# Patient Record
Sex: FEMALE | Race: WHITE | NOT HISPANIC OR LATINO | Employment: OTHER | URBAN - METROPOLITAN AREA
[De-identification: names, ages, dates, MRNs, and addresses within clinical notes are randomized per-mention and may not be internally consistent; named-entity substitution may affect disease eponyms.]

---

## 2023-04-06 DIAGNOSIS — R92.8 ABNORMAL MAMMOGRAM: Primary | ICD-10-CM

## 2023-04-17 PROBLEM — N63.13 MASS OF LOWER OUTER QUADRANT OF RIGHT BREAST: Status: ACTIVE | Noted: 2023-04-17

## 2023-04-17 NOTE — PROGRESS NOTES
[facility]  Edmundo Arrington MD, FACS  Jean Marie Cisneros NP-C      Chief Complaint:   Faina Villa is a 57 y.o. female presenting today after her imaging workup found a suspicious mass in the left  breast for which biopsy has been recommended.   for She reports no interval changes on her self-breast examination.     History of Present Illness:   Faina Villa is a 57 y.o. female who presents on with a left  breast mass for which biopsy has been recommended.  MD:::Patti Harmon NP    Past Medical History:   Diagnosis Date    Mass of lower outer quadrant of right breast 4/17/2023       History reviewed. No pertinent surgical history.     Current Outpatient Medications:     hydrOXYchloroQUINE (PLAQUENIL) 200 mg tablet, Take 200 mg by mouth 2 (two) times daily., Disp: , Rfl:     pantoprazole (PROTONIX) 20 MG tablet, Take 1 tablet by mouth 2 (two) times daily., Disp: , Rfl:     amLODIPine (NORVASC) 10 MG tablet, Take 10 mg by mouth., Disp: , Rfl:     biotin 10 mg Tab, Take by mouth., Disp: , Rfl:     cholecalciferol, vitamin D3, 325 mcg (13,000 unit) Cap capsule, Take by mouth., Disp: , Rfl:     co-enzyme Q-10 30 mg capsule, Take 100 mg by mouth., Disp: , Rfl:     estradioL (ESTRACE) 1 MG tablet, Take 1 mg by mouth., Disp: , Rfl:     ezetimibe (ZETIA) 10 mg tablet, Take 10 mg by mouth., Disp: , Rfl:     hydroCHLOROthiazide (HYDRODIURIL) 25 MG tablet, Take 25 mg by mouth., Disp: , Rfl:     losartan (COZAAR) 100 MG tablet, Take 100 mg by mouth., Disp: , Rfl:     montelukast (SINGULAIR) 10 mg tablet, Take 10 mg by mouth., Disp: , Rfl:     SYMBICORT 160-4.5 mcg/actuation HFAA, Inhale 2 puffs into the lungs 2 (two) times daily., Disp: , Rfl:    Review of patient's allergies indicates:   Allergen Reactions    Codeine Itching    Egg Other (See Comments)     Coughing    Lactase Other (See Comments)     Coughing     Oxycodone-acetaminophen Itching      Social History      Tobacco Use    Smoking status: Not on file    Smokeless tobacco: Not on file   Substance Use Topics    Alcohol use: Not on file      History reviewed. No pertinent family history.     Review of Systems   Integumentary:  Negative for color change, rash, mole/lesion, breast mass, breast discharge and breast tenderness.   Breast: Negative for mass and tenderness     Physical Exam   Constitutional: She appears well-developed. She is cooperative.   HENT:   Head: Normocephalic.   Cardiovascular:  Normal rate and regular rhythm.            Pulmonary/Chest: She exhibits no tenderness and no bony tenderness. Right breast exhibits no mass, no nipple discharge, no skin change and no tenderness. Left breast exhibits no mass, no nipple discharge, no skin change and no tenderness.   Abdominal: Soft. Normal appearance.   Musculoskeletal: Lymphadenopathy:      Upper Body:      Right upper body: No supraclavicular or axillary adenopathy.      Left upper body: No supraclavicular or axillary adenopathy.     Neurological: She is alert.   Skin: No rash noted.        MAMMOGRAM REPORT: The left breast is heterogeneously dense, which may obscure small masses. In the left breast at 4-5 o'clock, posterior depth, is a 10 x 12 mm mass, stable in size. However several small, round calcifications   have developed in the mass since prior studies. Immediately posterior to the mass is a biopsy marker.    ULTRASOUND REPORT: at 4:00 o'clock an 8-10 mm mass most likely corresponding to the mammographic finding.     NOTE:::We viewed her films together at today's visit.  We discussed the multiple views obtained and the important findings.  Even benign changes were mentioned and her questions were answered.        ASSESSMENT and PLAN of CARE    1. Mass of lower outer quadrant of right breast  Assessment & Plan:  Patient has been given the options of sonocore and excisional biopsy and all indications for each have been discussed. We reviewed her films  and reports. She understands the reasons behind obtaining tissue in order to determine a diagnosis.  We discussed the possible outcomes to include: benign, atypical, cancerous and non-correlative.  The last three would require an additional procedure.  She knows that further recommendations will be made after receiving the pathology report and that additional surgery/interventions may be needed.      PLAN:::: LEFT SONOCORE BREAST BIOPSY.    I will call her as soon as her pathology report is received and make additional recommendations.          2. Abnormal mammogram  -     Ambulatory referral/consult to Breast Surgery    Medical Decision Making:  It is my impression that this patient suffers all conditions contained in this medical document.  Each of these conditions did affect our plan of care and my medical decision making today.  It is my opinion that the medical decision making concerning this patient was of moderate difficulty based on the aforementioned conditions.  Any further recommendations will be communicated to the patient by me.  I have reviewed and verified her allergies, list of medications, medical and surgical histories, social history, and a pertinent review of symptoms.    Follow up:  I will phone her with her pathology report and make additional recommendations at that time.        Addendum: Her left sonocore core breast biopsy showed benign changes, and nothing atypical or cancerous. We will repeat her mammogram in 6 months.  She is aware.           details…

## 2023-04-18 NOTE — ASSESSMENT & PLAN NOTE
Patient has been given the options of sonocore and excisional biopsy and all indications for each have been discussed. We reviewed her films and reports. She understands the reasons behind obtaining tissue in order to determine a diagnosis.  We discussed the possible outcomes to include: benign, atypical, cancerous and non-correlative.  The last three would require an additional procedure.  She knows that further recommendations will be made after receiving the pathology report and that additional surgery/interventions may be needed.      PLAN:::: LEFT SONOCORE BREAST BIOPSY.    I will call her as soon as her pathology report is received and make additional recommendations.

## 2023-04-24 ENCOUNTER — OFFICE VISIT (OUTPATIENT)
Dept: SURGERY | Facility: CLINIC | Age: 58
End: 2023-04-24
Payer: COMMERCIAL

## 2023-04-24 VITALS — HEIGHT: 63 IN | BODY MASS INDEX: 29.23 KG/M2 | WEIGHT: 165 LBS

## 2023-04-24 DIAGNOSIS — R92.8 ABNORMAL MAMMOGRAM: ICD-10-CM

## 2023-04-24 DIAGNOSIS — N63.13 MASS OF LOWER OUTER QUADRANT OF RIGHT BREAST: Primary | ICD-10-CM

## 2023-04-24 PROCEDURE — 1160F PR REVIEW ALL MEDS BY PRESCRIBER/CLIN PHARMACIST DOCUMENTED: ICD-10-PCS | Mod: CPTII,S$GLB,, | Performed by: SPECIALIST

## 2023-04-24 PROCEDURE — 99204 PR OFFICE/OUTPT VISIT, NEW, LEVL IV, 45-59 MIN: ICD-10-PCS | Mod: S$GLB,,, | Performed by: SPECIALIST

## 2023-04-24 PROCEDURE — 1160F RVW MEDS BY RX/DR IN RCRD: CPT | Mod: CPTII,S$GLB,, | Performed by: SPECIALIST

## 2023-04-24 PROCEDURE — 1159F MED LIST DOCD IN RCRD: CPT | Mod: CPTII,S$GLB,, | Performed by: SPECIALIST

## 2023-04-24 PROCEDURE — 4010F PR ACE/ARB THEARPY RXD/TAKEN: ICD-10-PCS | Mod: CPTII,S$GLB,, | Performed by: SPECIALIST

## 2023-04-24 PROCEDURE — 1159F PR MEDICATION LIST DOCUMENTED IN MEDICAL RECORD: ICD-10-PCS | Mod: CPTII,S$GLB,, | Performed by: SPECIALIST

## 2023-04-24 PROCEDURE — 4010F ACE/ARB THERAPY RXD/TAKEN: CPT | Mod: CPTII,S$GLB,, | Performed by: SPECIALIST

## 2023-04-24 PROCEDURE — 99204 OFFICE O/P NEW MOD 45 MIN: CPT | Mod: S$GLB,,, | Performed by: SPECIALIST

## 2023-04-24 PROCEDURE — 3008F BODY MASS INDEX DOCD: CPT | Mod: CPTII,S$GLB,, | Performed by: SPECIALIST

## 2023-04-24 PROCEDURE — 3008F PR BODY MASS INDEX (BMI) DOCUMENTED: ICD-10-PCS | Mod: CPTII,S$GLB,, | Performed by: SPECIALIST

## 2023-04-24 RX ORDER — MONTELUKAST SODIUM 10 MG/1
10 TABLET ORAL
COMMUNITY
Start: 2023-03-28

## 2023-04-24 RX ORDER — BUDESONIDE AND FORMOTEROL FUMARATE DIHYDRATE 160; 4.5 UG/1; UG/1
2 AEROSOL RESPIRATORY (INHALATION) 2 TIMES DAILY
COMMUNITY
Start: 2023-03-28

## 2023-04-24 RX ORDER — ESTRADIOL 1 MG/1
1 TABLET ORAL
COMMUNITY
Start: 2023-03-28

## 2023-04-24 RX ORDER — BIOTIN 10 MG
TABLET ORAL
COMMUNITY

## 2023-04-24 RX ORDER — EZETIMIBE 10 MG/1
10 TABLET ORAL
COMMUNITY
Start: 2023-03-28

## 2023-04-24 RX ORDER — AMLODIPINE BESYLATE 10 MG/1
10 TABLET ORAL
COMMUNITY
Start: 2023-03-28

## 2023-04-24 RX ORDER — PANTOPRAZOLE SODIUM 20 MG/1
1 TABLET, DELAYED RELEASE ORAL 2 TIMES DAILY
COMMUNITY
Start: 2022-10-24

## 2023-04-24 RX ORDER — HYDROXYCHLOROQUINE SULFATE 200 MG/1
200 TABLET, FILM COATED ORAL 2 TIMES DAILY
COMMUNITY
Start: 2023-02-22

## 2023-04-24 RX ORDER — LOSARTAN POTASSIUM 100 MG/1
100 TABLET ORAL
COMMUNITY
Start: 2023-03-28

## 2023-04-24 RX ORDER — UBIDECARENONE 30 MG
100 CAPSULE ORAL
COMMUNITY

## 2023-04-24 RX ORDER — HYDROCHLOROTHIAZIDE 25 MG/1
25 TABLET ORAL
COMMUNITY
Start: 2023-03-28

## 2023-04-28 PROBLEM — N63.23 MASS OF LOWER OUTER QUADRANT OF LEFT BREAST: Status: ACTIVE | Noted: 2023-04-17

## 2023-04-28 NOTE — PROGRESS NOTES
[facility]  Edmundo Arrington MD, FACS  Jean Marie Cisneros NP-C      Chief Complaint:   Faina Villa is a 57 y.o. female presenting today after her imaging workup found a suspicious mass in the left  breast.  Sonocore biopsy was benign but this did not correlate and excision has been recommended.    History of Present Illness:   Faina Villa is a 57 y.o. female who presented on April 24, 203 on with a left  breast mass fonocore biopsy was benign but this did not correlate and excision has been recommended.  MD:::Patti Harmon NP    Past Medical History:   Diagnosis Date    Mass of lower outer quadrant of right breast 4/17/2023       History reviewed. No pertinent surgical history.     Current Outpatient Medications:     amLODIPine (NORVASC) 10 MG tablet, Take 10 mg by mouth., Disp: , Rfl:     biotin 10 mg Tab, Take by mouth., Disp: , Rfl:     cholecalciferol, vitamin D3, 325 mcg (13,000 unit) Cap capsule, Take by mouth., Disp: , Rfl:     co-enzyme Q-10 30 mg capsule, Take 100 mg by mouth., Disp: , Rfl:     estradioL (ESTRACE) 1 MG tablet, Take 1 mg by mouth., Disp: , Rfl:     ezetimibe (ZETIA) 10 mg tablet, Take 10 mg by mouth., Disp: , Rfl:     hydroCHLOROthiazide (HYDRODIURIL) 25 MG tablet, Take 25 mg by mouth., Disp: , Rfl:     hydrOXYchloroQUINE (PLAQUENIL) 200 mg tablet, Take 200 mg by mouth 2 (two) times daily., Disp: , Rfl:     losartan (COZAAR) 100 MG tablet, Take 100 mg by mouth., Disp: , Rfl:     montelukast (SINGULAIR) 10 mg tablet, Take 10 mg by mouth., Disp: , Rfl:     pantoprazole (PROTONIX) 20 MG tablet, Take 1 tablet by mouth 2 (two) times daily., Disp: , Rfl:     SYMBICORT 160-4.5 mcg/actuation HFAA, Inhale 2 puffs into the lungs 2 (two) times daily., Disp: , Rfl:    Review of patient's allergies indicates:   Allergen Reactions    Codeine Itching    Egg Other (See Comments)     Coughing    Lactase Other (See Comments)     Coughing      Oxycodone-acetaminophen Itching      Social History     Tobacco Use    Smoking status: Not on file    Smokeless tobacco: Not on file   Substance Use Topics    Alcohol use: Not on file      History reviewed. No pertinent family history.     Review of Systems   Integumentary:  Negative for color change, rash, mole/lesion, breast mass, breast discharge and breast tenderness.   Breast: Negative for mass and tenderness     Physical Exam   Constitutional: She appears well-developed. She is cooperative.   HENT:   Head: Normocephalic.   Cardiovascular:  Normal rate and regular rhythm.            Pulmonary/Chest: She exhibits no tenderness and no bony tenderness. Right breast exhibits no mass, no nipple discharge, no skin change and no tenderness. Left breast exhibits no mass, no nipple discharge, no skin change and no tenderness.   Abdominal: Soft. Normal appearance.   Musculoskeletal: Lymphadenopathy:      Upper Body:      Right upper body: No supraclavicular or axillary adenopathy.      Left upper body: No supraclavicular or axillary adenopathy.     Neurological: She is alert.   Skin: No rash noted.        MAMMOGRAM REPORT: The left breast is heterogeneously dense, which may obscure small masses. In the left breast at 4-5 o'clock, posterior depth, is a 10 x 12 mm mass, stable in size. However several small, round calcifications   have developed in the mass since prior studies. Immediately posterior to the mass is a biopsy marker.    ULTRASOUND REPORT: at 4:00 o'clock an 8-10 mm mass most likely corresponding to the mammographic finding.     NOTE:::We viewed her films together at today's visit.  We discussed the multiple views obtained and the important findings.  Even benign changes were mentioned and her questions were answered.        ASSESSMENT and PLAN of CARE    1. Mass of lower outer quadrant of left breast  Assessment & Plan:  We discussed her path report and the resultant correlative report which showed this to be  discordant.  We talked about careful follow-up versus excision and how our recommendation is for excision at this time.  We discussed the risks, benefits, indications and alternatives at length.  She wishesto proceed.  I will phone her with her pathology report once it is received. PLAN:::LEFT BREAST EXCISIONAL BIOPSY AFTER NEEDLE LOCALIZATION.      Medical Decision Making:  It is my impression that this patient suffers all conditions contained in this medical document.  Each of these conditions did affect our plan of care and my medical decision making today.  It is my opinion that the medical decision making concerning this patient was of moderate difficulty based on the aforementioned conditions.  Any further recommendations will be communicated to the patient by me.  I have reviewed and verified her allergies, list of medications, medical and surgical histories, social history, and a pertinent review of symptoms.    Follow up:  2 weeks for post op

## 2023-04-28 NOTE — ASSESSMENT & PLAN NOTE
We discussed her path report and the resultant correlative report which showed this to be discordant.  We talked about careful follow-up versus excision and how our recommendation is for excision at this time.  We discussed the risks, benefits, indications and alternatives at length.  She wishesto proceed.  I will phone her with her pathology report once it is received. PLAN:::LEFT BREAST EXCISIONAL BIOPSY AFTER NEEDLE LOCALIZATION.

## 2023-05-03 ENCOUNTER — OFFICE VISIT (OUTPATIENT)
Dept: SURGERY | Facility: CLINIC | Age: 58
End: 2023-05-03
Payer: COMMERCIAL

## 2023-05-03 DIAGNOSIS — N63.23 MASS OF LOWER OUTER QUADRANT OF LEFT BREAST: ICD-10-CM

## 2023-05-03 PROCEDURE — 1160F PR REVIEW ALL MEDS BY PRESCRIBER/CLIN PHARMACIST DOCUMENTED: ICD-10-PCS | Mod: CPTII,S$GLB,, | Performed by: SPECIALIST

## 2023-05-03 PROCEDURE — 99213 OFFICE O/P EST LOW 20 MIN: CPT | Mod: S$GLB,,, | Performed by: SPECIALIST

## 2023-05-03 PROCEDURE — 99213 PR OFFICE/OUTPT VISIT, EST, LEVL III, 20-29 MIN: ICD-10-PCS | Mod: S$GLB,,, | Performed by: SPECIALIST

## 2023-05-03 PROCEDURE — 1160F RVW MEDS BY RX/DR IN RCRD: CPT | Mod: CPTII,S$GLB,, | Performed by: SPECIALIST

## 2023-05-03 PROCEDURE — 1159F MED LIST DOCD IN RCRD: CPT | Mod: CPTII,S$GLB,, | Performed by: SPECIALIST

## 2023-05-03 PROCEDURE — 4010F PR ACE/ARB THEARPY RXD/TAKEN: ICD-10-PCS | Mod: CPTII,S$GLB,, | Performed by: SPECIALIST

## 2023-05-03 PROCEDURE — 4010F ACE/ARB THERAPY RXD/TAKEN: CPT | Mod: CPTII,S$GLB,, | Performed by: SPECIALIST

## 2023-05-03 PROCEDURE — 1159F PR MEDICATION LIST DOCUMENTED IN MEDICAL RECORD: ICD-10-PCS | Mod: CPTII,S$GLB,, | Performed by: SPECIALIST

## 2023-05-10 RX ORDER — HYDROCODONE BITARTRATE AND ACETAMINOPHEN 7.5; 325 MG/1; MG/1
TABLET ORAL
Qty: 10 TABLET | Refills: 0 | OUTPATIENT
Start: 2023-05-10

## 2023-05-11 ENCOUNTER — OUTSIDE PLACE OF SERVICE (OUTPATIENT)
Dept: SURGERY | Facility: CLINIC | Age: 58
End: 2023-05-11
Payer: COMMERCIAL

## 2023-05-11 PROCEDURE — 19125 EXCISION BREAST LESION: CPT | Mod: LT,,, | Performed by: SPECIALIST

## 2023-05-11 PROCEDURE — 19125 PR EXCISE BREAST LES W XRAY MARKER: ICD-10-PCS | Mod: LT,,, | Performed by: SPECIALIST

## 2023-05-12 NOTE — PROGRESS NOTES
[facility]  Edmundo Arrington MD, FACS  Jean Marie Cisneros NP-C      Chief Complaint:   Faina Villa is a 57 y.o. female presenting today for a post operative evaluation.    History of Present Illness:   Faina Villa is a 57 y.o. female who presented on April 24, 203 on with a left  breast mass sonocore biopsy was benign but this did not correlate and excision showed a benign fibroadenoma.  MD:::Patti Harmon NP    Past Medical History:   Diagnosis Date    Mass of lower outer quadrant of right breast 4/17/2023       Past Surgical History:   Procedure Laterality Date    left breast biopsy after needle localization  05/11/2023        Current Outpatient Medications:     amLODIPine (NORVASC) 10 MG tablet, Take 10 mg by mouth., Disp: , Rfl:     biotin 10 mg Tab, Take by mouth., Disp: , Rfl:     cholecalciferol, vitamin D3, 325 mcg (13,000 unit) Cap capsule, Take by mouth., Disp: , Rfl:     co-enzyme Q-10 30 mg capsule, Take 100 mg by mouth., Disp: , Rfl:     estradioL (ESTRACE) 1 MG tablet, Take 1 mg by mouth., Disp: , Rfl:     ezetimibe (ZETIA) 10 mg tablet, Take 10 mg by mouth., Disp: , Rfl:     hydroCHLOROthiazide (HYDRODIURIL) 25 MG tablet, Take 25 mg by mouth., Disp: , Rfl:     hydrOXYchloroQUINE (PLAQUENIL) 200 mg tablet, Take 200 mg by mouth 2 (two) times daily., Disp: , Rfl:     losartan (COZAAR) 100 MG tablet, Take 100 mg by mouth., Disp: , Rfl:     montelukast (SINGULAIR) 10 mg tablet, Take 10 mg by mouth., Disp: , Rfl:     ondansetron (ZOFRAN-ODT) 4 MG TbDL, Take 1 tablet (4 mg total) by mouth every 6 (six) hours as needed (marli)., Disp: 20 tablet, Rfl: 1    pantoprazole (PROTONIX) 20 MG tablet, Take 1 tablet by mouth 2 (two) times daily., Disp: , Rfl:     SYMBICORT 160-4.5 mcg/actuation HFAA, Inhale 2 puffs into the lungs 2 (two) times daily., Disp: , Rfl:    Review of patient's allergies indicates:   Allergen Reactions    Codeine Itching    Egg Other (See  Comments)     Coughing    Lactase Other (See Comments)     Coughing     Oxycodone-acetaminophen Itching      Social History     Tobacco Use    Smoking status: Not on file    Smokeless tobacco: Not on file   Substance Use Topics    Alcohol use: Not on file      History reviewed. No pertinent family history.     Review of Systems   Constitutional:  Positive for activity change (slowed down after surgery). Negative for fever.   Respiratory:  Negative for shortness of breath.    Cardiovascular:  Negative for chest pain.   Integumentary:  Negative for color change, rash, wound, mole/lesion, breast mass, breast discharge and breast tenderness.   Breast: Negative for mass and tenderness     Physical Exam   Constitutional: She appears well-developed. She is cooperative.   HENT:   Head: Normocephalic.   Cardiovascular:  Normal rate and regular rhythm.            Pulmonary/Chest: Effort normal. She exhibits no tenderness, no bony tenderness and no swelling. Right breast exhibits no mass, no nipple discharge, no skin change and no tenderness. Left breast exhibits tenderness (typical post operative pain). Left breast exhibits no mass, no nipple discharge and no skin change.   Abdominal: Soft. Normal appearance.   Musculoskeletal: Lymphadenopathy:      Upper Body:      Right upper body: No supraclavicular or axillary adenopathy.      Left upper body: No supraclavicular or axillary adenopathy.     Neurological: She is alert.   Skin: No rash noted.       ASSESSMENT and PLAN of CARE    1. Mass of lower outer quadrant of left breast  Assessment & Plan:  She is doing great post operative and will alert me to any issues. Pathology reports were discussed in detail. All questions were answered and recommendations were made for future treatments/follow-up. We will offer repeat imaging in 6 months.      Medical Decision Making:  It is my impression that this patient suffers all conditions contained in this medical document.  Each of these  conditions did affect our plan of care and my medical decision making today.  It is my opinion that the medical decision making concerning this patient was of minimal difficulty based on the aforementioned conditions.  Any further recommendations will be communicated to the patient by me.  I have reviewed and verified her allergies, list of medications, medical and surgical histories, social history, and a pertinent review of symptoms.    Follow up:  6 months and prn for repeat MGM (D)

## 2023-05-12 NOTE — ASSESSMENT & PLAN NOTE
She is doing great post operative and will alert me to any issues. Pathology reports were discussed in detail. All questions were answered and recommendations were made for future treatments/follow-up. We will offer repeat imaging in 6 months.

## 2023-05-22 ENCOUNTER — OFFICE VISIT (OUTPATIENT)
Dept: SURGERY | Facility: CLINIC | Age: 58
End: 2023-05-22
Payer: COMMERCIAL

## 2023-05-22 DIAGNOSIS — N63.23 MASS OF LOWER OUTER QUADRANT OF LEFT BREAST: ICD-10-CM

## 2023-05-22 PROCEDURE — 1160F RVW MEDS BY RX/DR IN RCRD: CPT | Mod: CPTII,S$GLB,, | Performed by: SPECIALIST

## 2023-05-22 PROCEDURE — 4010F ACE/ARB THERAPY RXD/TAKEN: CPT | Mod: CPTII,S$GLB,, | Performed by: SPECIALIST

## 2023-05-22 PROCEDURE — 99024 POSTOP FOLLOW-UP VISIT: CPT | Mod: S$GLB,,, | Performed by: SPECIALIST

## 2023-05-22 PROCEDURE — 1159F MED LIST DOCD IN RCRD: CPT | Mod: CPTII,S$GLB,, | Performed by: SPECIALIST

## 2023-05-22 PROCEDURE — 1160F PR REVIEW ALL MEDS BY PRESCRIBER/CLIN PHARMACIST DOCUMENTED: ICD-10-PCS | Mod: CPTII,S$GLB,, | Performed by: SPECIALIST

## 2023-05-22 PROCEDURE — 99024 PR POST-OP FOLLOW-UP VISIT: ICD-10-PCS | Mod: S$GLB,,, | Performed by: SPECIALIST

## 2023-05-22 PROCEDURE — 1159F PR MEDICATION LIST DOCUMENTED IN MEDICAL RECORD: ICD-10-PCS | Mod: CPTII,S$GLB,, | Performed by: SPECIALIST

## 2023-05-22 PROCEDURE — 4010F PR ACE/ARB THEARPY RXD/TAKEN: ICD-10-PCS | Mod: CPTII,S$GLB,, | Performed by: SPECIALIST

## 2023-10-17 DIAGNOSIS — D24.2 BREAST FIBROADENOMA, LEFT: Primary | ICD-10-CM

## 2023-10-17 NOTE — PROGRESS NOTES
[facility]  MD Jean Marie Bowen, NP-C    Date of Service: 10/23/2023      Chief Complaint:   Faina Villa is a 58 y.o. female presenting today for  6 month follow up. She is due for Mammogram  She reports no interval changes on her self-breast examination.     History of Present Illness:   Faina Villa is a 57 y.o. female who presented on April 24, 203 with a left  breast mass sonocore biopsy was benign but this did not correlate and excision showed a benign fibroadenoma.  MD:::Patti Harmon NP    Past Medical History:   Diagnosis Date    Breast fibroadenoma, left 10/17/2023    Mass of lower outer quadrant of right breast 4/17/2023      Past Surgical History:   Procedure Laterality Date    left breast biopsy after needle localization  05/11/2023    TOTAL ABDOMINAL HYSTERECTOMY W/ BILATERAL SALPINGOOPHORECTOMY Bilateral         Current Outpatient Medications:     amLODIPine (NORVASC) 10 MG tablet, Take 10 mg by mouth., Disp: , Rfl:     biotin 10 mg Tab, Take by mouth., Disp: , Rfl:     cholecalciferol, vitamin D3, 325 mcg (13,000 unit) Cap capsule, Take by mouth., Disp: , Rfl:     co-enzyme Q-10 30 mg capsule, Take 100 mg by mouth., Disp: , Rfl:     estradioL (ESTRACE) 1 MG tablet, Take 1 mg by mouth., Disp: , Rfl:     ezetimibe (ZETIA) 10 mg tablet, Take 10 mg by mouth., Disp: , Rfl:     hydroCHLOROthiazide (HYDRODIURIL) 25 MG tablet, Take 25 mg by mouth., Disp: , Rfl:     hydrOXYchloroQUINE (PLAQUENIL) 200 mg tablet, Take 200 mg by mouth 2 (two) times daily., Disp: , Rfl:     losartan (COZAAR) 100 MG tablet, Take 100 mg by mouth., Disp: , Rfl:     montelukast (SINGULAIR) 10 mg tablet, Take 10 mg by mouth., Disp: , Rfl:     ondansetron (ZOFRAN-ODT) 4 MG TbDL, Take 1 tablet (4 mg total) by mouth every 6 (six) hours as needed (marli)., Disp: 20 tablet, Rfl: 1    pantoprazole (PROTONIX) 20 MG tablet, Take 1 tablet by mouth 2 (two) times daily., Disp:  , Rfl:     SYMBICORT 160-4.5 mcg/actuation HFAA, Inhale 2 puffs into the lungs 2 (two) times daily., Disp: , Rfl:    Review of patient's allergies indicates:   Allergen Reactions    Codeine Itching    Egg Other (See Comments)     Coughing    Lactase Other (See Comments)     Coughing     Oxycodone-acetaminophen Itching      Social History     Tobacco Use    Smoking status: Every Day     Types: Cigarettes    Smokeless tobacco: Never   Substance Use Topics    Alcohol use: Not on file      Family History   Problem Relation Age of Onset    Breast cancer Neg Hx     Ovarian cancer Neg Hx         Review of Systems   Integumentary:  Negative for color change, rash, mole/lesion, breast mass, breast discharge and breast tenderness.   Breast: Negative for mass and tenderness       Physical Exam   HENT:   Head: Normocephalic.   Pulmonary/Chest: Right breast exhibits no inverted nipple, no mass, no nipple discharge, no skin change and no tenderness. Left breast exhibits no inverted nipple, no mass, no nipple discharge, no skin change and no tenderness. No breast swelling.   Genitourinary: No breast swelling.   Musculoskeletal: Lymphadenopathy:      Upper Body:      Right upper body: No supraclavicular or axillary adenopathy.      Left upper body: No supraclavicular or axillary adenopathy.     Neurological: She is alert.      MAMMOGRAM REPORT: She has some diffuse fibronodular tissue; there are no spiculated lesions, distortions or suspicious calcifications noted; NEM    NOTE:::We viewed her films together at today's visit.  We discussed the multiple views obtained and the important findings.  Even benign changes were mentioned and her questions were answered.  She knows that she may receive a formal letter or report from the Radiologist.  She is to contact us if she has questions.      Assessment/Plan  1. Breast fibroadenoma, left  Assessment & Plan:  We reviewed our findings today and her questions were answered.  She  understands that her imaging and exams have remained stable (and show nothing concerning).  She is comfortable being followed in a conservative fashion.      She understands the importance of monthly self-breast examination and knows to report any and all changes as they occur.        2. Mass of lower outer quadrant of left breast  Assessment & Plan:  Same as above             Medical Decision Making:  It is my impression that this patient suffers all conditions contained in this medical document.  Each of these conditions did affect our plan of care and my medical decision making today.  It is my opinion that the medical decision making concerning this patient was of moderate difficulty based on the aforementioned conditions.  Any further recommendations will be communicated to the patient by me.  I have reviewed and verified her allergies, list of medications, medical and surgical histories, social history, and a pertinent review of symptoms.      Follow up:  1 year and prn    For:  GUMARO KRISHNAMURTHY) at        Addendum 10/23/23 1803: Mammogram- FINDINGS:  There are scattered fibroglandular elements noted. There are no suspicious masses or suspicious calcifications seen to suggest malignancy. The patient has had left breast core biopsy and left breast excisional biopsy since  the previous exam.IMPRESSION: No evidence of malignancy. New changes of left breast biopsy.Otherwise, no significant change when compared to previous outside exams.  Follow-up mammography is recommended in one

## 2023-10-23 ENCOUNTER — OFFICE VISIT (OUTPATIENT)
Dept: SURGERY | Facility: CLINIC | Age: 58
End: 2023-10-23
Payer: COMMERCIAL

## 2023-10-23 DIAGNOSIS — D24.2 BREAST FIBROADENOMA, LEFT: Primary | ICD-10-CM

## 2023-10-23 DIAGNOSIS — N63.23 MASS OF LOWER OUTER QUADRANT OF LEFT BREAST: ICD-10-CM

## 2023-10-23 PROCEDURE — 99999 PR PBB SHADOW E&M-EST. PATIENT-LVL III: ICD-10-PCS | Mod: PBBFAC,,, | Performed by: NURSE PRACTITIONER

## 2023-10-23 PROCEDURE — 99999 PR PBB SHADOW E&M-EST. PATIENT-LVL III: CPT | Mod: PBBFAC,,, | Performed by: NURSE PRACTITIONER

## 2023-10-23 PROCEDURE — 1159F MED LIST DOCD IN RCRD: CPT | Mod: CPTII,S$GLB,, | Performed by: NURSE PRACTITIONER

## 2023-10-23 PROCEDURE — 1160F RVW MEDS BY RX/DR IN RCRD: CPT | Mod: CPTII,S$GLB,, | Performed by: NURSE PRACTITIONER

## 2023-10-23 PROCEDURE — 99213 PR OFFICE/OUTPT VISIT, EST, LEVL III, 20-29 MIN: ICD-10-PCS | Mod: S$GLB,,, | Performed by: NURSE PRACTITIONER

## 2023-10-23 PROCEDURE — 1160F PR REVIEW ALL MEDS BY PRESCRIBER/CLIN PHARMACIST DOCUMENTED: ICD-10-PCS | Mod: CPTII,S$GLB,, | Performed by: NURSE PRACTITIONER

## 2023-10-23 PROCEDURE — 4010F ACE/ARB THERAPY RXD/TAKEN: CPT | Mod: CPTII,S$GLB,, | Performed by: NURSE PRACTITIONER

## 2023-10-23 PROCEDURE — 1159F PR MEDICATION LIST DOCUMENTED IN MEDICAL RECORD: ICD-10-PCS | Mod: CPTII,S$GLB,, | Performed by: NURSE PRACTITIONER

## 2023-10-23 PROCEDURE — 4010F PR ACE/ARB THEARPY RXD/TAKEN: ICD-10-PCS | Mod: CPTII,S$GLB,, | Performed by: NURSE PRACTITIONER

## 2023-10-23 PROCEDURE — 3044F PR MOST RECENT HEMOGLOBIN A1C LEVEL <7.0%: ICD-10-PCS | Mod: CPTII,S$GLB,, | Performed by: NURSE PRACTITIONER

## 2023-10-23 PROCEDURE — 99213 OFFICE O/P EST LOW 20 MIN: CPT | Mod: S$GLB,,, | Performed by: NURSE PRACTITIONER

## 2023-10-23 PROCEDURE — 3044F HG A1C LEVEL LT 7.0%: CPT | Mod: CPTII,S$GLB,, | Performed by: NURSE PRACTITIONER
